# Patient Record
Sex: MALE | Race: BLACK OR AFRICAN AMERICAN | NOT HISPANIC OR LATINO | Employment: FULL TIME | URBAN - METROPOLITAN AREA
[De-identification: names, ages, dates, MRNs, and addresses within clinical notes are randomized per-mention and may not be internally consistent; named-entity substitution may affect disease eponyms.]

---

## 2020-05-08 ENCOUNTER — HISTORICAL (OUTPATIENT)
Dept: LAB | Facility: HOSPITAL | Age: 45
End: 2020-05-08

## 2020-12-07 ENCOUNTER — HISTORICAL (OUTPATIENT)
Dept: LAB | Facility: HOSPITAL | Age: 45
End: 2020-12-07

## 2021-07-19 ENCOUNTER — HISTORICAL (OUTPATIENT)
Dept: LAB | Facility: HOSPITAL | Age: 46
End: 2021-07-19

## 2021-07-19 LAB — SARS-COV-2 RNA RESP QL NAA+PROBE: DETECTED

## 2021-12-28 ENCOUNTER — HISTORICAL (OUTPATIENT)
Dept: LAB | Facility: HOSPITAL | Age: 46
End: 2021-12-28

## 2022-04-11 ENCOUNTER — HISTORICAL (OUTPATIENT)
Dept: ADMINISTRATIVE | Facility: HOSPITAL | Age: 47
End: 2022-04-11
Payer: COMMERCIAL

## 2022-04-29 VITALS — OXYGEN SATURATION: 100 % | SYSTOLIC BLOOD PRESSURE: 124 MMHG | DIASTOLIC BLOOD PRESSURE: 98 MMHG

## 2022-06-03 ENCOUNTER — OFFICE VISIT (OUTPATIENT)
Dept: PRIMARY CARE CLINIC | Facility: CLINIC | Age: 47
End: 2022-06-03
Payer: COMMERCIAL

## 2022-06-03 VITALS
OXYGEN SATURATION: 98 % | BODY MASS INDEX: 28.77 KG/M2 | HEART RATE: 64 BPM | RESPIRATION RATE: 20 BRPM | SYSTOLIC BLOOD PRESSURE: 120 MMHG | WEIGHT: 201 LBS | DIASTOLIC BLOOD PRESSURE: 80 MMHG | HEIGHT: 70 IN

## 2022-06-03 DIAGNOSIS — Z12.11 SCREENING FOR COLON CANCER: ICD-10-CM

## 2022-06-03 DIAGNOSIS — N52.9 ERECTILE DYSFUNCTION, UNSPECIFIED ERECTILE DYSFUNCTION TYPE: ICD-10-CM

## 2022-06-03 DIAGNOSIS — G47.26 SHIFT WORK SLEEP DISORDER: Primary | ICD-10-CM

## 2022-06-03 DIAGNOSIS — Z00.00 WELLNESS EXAMINATION: ICD-10-CM

## 2022-06-03 PROCEDURE — 99213 PR OFFICE/OUTPT VISIT, EST, LEVL III, 20-29 MIN: ICD-10-PCS | Mod: ,,, | Performed by: STUDENT IN AN ORGANIZED HEALTH CARE EDUCATION/TRAINING PROGRAM

## 2022-06-03 PROCEDURE — 3074F SYST BP LT 130 MM HG: CPT | Mod: CPTII,,, | Performed by: STUDENT IN AN ORGANIZED HEALTH CARE EDUCATION/TRAINING PROGRAM

## 2022-06-03 PROCEDURE — 3008F BODY MASS INDEX DOCD: CPT | Mod: CPTII,,, | Performed by: STUDENT IN AN ORGANIZED HEALTH CARE EDUCATION/TRAINING PROGRAM

## 2022-06-03 PROCEDURE — 3079F DIAST BP 80-89 MM HG: CPT | Mod: CPTII,,, | Performed by: STUDENT IN AN ORGANIZED HEALTH CARE EDUCATION/TRAINING PROGRAM

## 2022-06-03 PROCEDURE — 1160F RVW MEDS BY RX/DR IN RCRD: CPT | Mod: CPTII,,, | Performed by: STUDENT IN AN ORGANIZED HEALTH CARE EDUCATION/TRAINING PROGRAM

## 2022-06-03 PROCEDURE — 1160F PR REVIEW ALL MEDS BY PRESCRIBER/CLIN PHARMACIST DOCUMENTED: ICD-10-PCS | Mod: CPTII,,, | Performed by: STUDENT IN AN ORGANIZED HEALTH CARE EDUCATION/TRAINING PROGRAM

## 2022-06-03 PROCEDURE — 1159F MED LIST DOCD IN RCRD: CPT | Mod: CPTII,,, | Performed by: STUDENT IN AN ORGANIZED HEALTH CARE EDUCATION/TRAINING PROGRAM

## 2022-06-03 PROCEDURE — 3074F PR MOST RECENT SYSTOLIC BLOOD PRESSURE < 130 MM HG: ICD-10-PCS | Mod: CPTII,,, | Performed by: STUDENT IN AN ORGANIZED HEALTH CARE EDUCATION/TRAINING PROGRAM

## 2022-06-03 PROCEDURE — 1159F PR MEDICATION LIST DOCUMENTED IN MEDICAL RECORD: ICD-10-PCS | Mod: CPTII,,, | Performed by: STUDENT IN AN ORGANIZED HEALTH CARE EDUCATION/TRAINING PROGRAM

## 2022-06-03 PROCEDURE — 99213 OFFICE O/P EST LOW 20 MIN: CPT | Mod: ,,, | Performed by: STUDENT IN AN ORGANIZED HEALTH CARE EDUCATION/TRAINING PROGRAM

## 2022-06-03 PROCEDURE — 3008F PR BODY MASS INDEX (BMI) DOCUMENTED: ICD-10-PCS | Mod: CPTII,,, | Performed by: STUDENT IN AN ORGANIZED HEALTH CARE EDUCATION/TRAINING PROGRAM

## 2022-06-03 PROCEDURE — 3079F PR MOST RECENT DIASTOLIC BLOOD PRESSURE 80-89 MM HG: ICD-10-PCS | Mod: CPTII,,, | Performed by: STUDENT IN AN ORGANIZED HEALTH CARE EDUCATION/TRAINING PROGRAM

## 2022-06-03 RX ORDER — TADALAFIL 5 MG/1
5 TABLET ORAL DAILY PRN
COMMUNITY
Start: 2022-02-22 | End: 2022-06-03 | Stop reason: SDUPTHER

## 2022-06-03 RX ORDER — TADALAFIL 20 MG/1
20 TABLET ORAL DAILY PRN
Qty: 30 TABLET | Refills: 5 | Status: SHIPPED | OUTPATIENT
Start: 2022-06-03 | End: 2023-02-13 | Stop reason: SDUPTHER

## 2022-06-03 RX ORDER — QUETIAPINE FUMARATE 50 MG/1
TABLET, FILM COATED ORAL
COMMUNITY
Start: 2022-03-15 | End: 2022-06-03 | Stop reason: SDUPTHER

## 2022-06-03 RX ORDER — QUETIAPINE FUMARATE 50 MG/1
TABLET, FILM COATED ORAL
Qty: 30 TABLET | Refills: 5 | Status: SHIPPED | OUTPATIENT
Start: 2022-06-03 | End: 2023-02-13 | Stop reason: SDUPTHER

## 2022-06-03 NOTE — ASSESSMENT & PLAN NOTE
Responded well to Seroquel 50 mg (1 to 2 tabs depending on circumstances, fatigue level, etc.). Refilled this. Can take 1 to 2 tabs PRN for sleep

## 2022-06-03 NOTE — ASSESSMENT & PLAN NOTE
Increase Cialis to max of 20 mg daily. Discussed risks and potential side effects. Pt expressed understanding.   Will also check testosterone (has some fatigue, weakness, etc) and PSA (needed for wellness anyway)

## 2022-06-03 NOTE — PROGRESS NOTES
Subjective:       Patient ID: Samuel García is a 47 y.o. male.    Past Medical History:  Headache  Insomnia     Chief Complaint: Follow-up (States sleeping better)    Presents for f/u on sleep issues and erectile dysfunction. Sleeping better now. Using 50 mg Seroquel most nights and tolerating well. Erectile dysfunction persists. He tried Cialis 5 mg and 10 mg as well as paroxetine (suspected premature ejaculation was primary issue). None of these worked fully. He tried Cialis 20 mg and had a very good response. Never had labs done from first visit.    Review of Systems   Constitutional: Negative for chills, fatigue, fever and unexpected weight change.   HENT: Negative for nasal congestion, hearing loss, sinus pressure/congestion and sore throat.    Eyes: Negative for pain, redness and visual disturbance.   Respiratory: Negative for cough, shortness of breath and wheezing.    Cardiovascular: Negative for chest pain, palpitations and leg swelling.   Gastrointestinal: Negative for abdominal pain, change in bowel habit, diarrhea, nausea, vomiting and change in bowel habit.   Endocrine: Negative for cold intolerance, heat intolerance, polydipsia and polyuria.   Genitourinary: Negative for dysuria, frequency, hematuria and urgency.   Musculoskeletal: Negative for arthralgias, joint swelling and myalgias.   Integumentary:  Negative for color change and rash.   Neurological: Negative for dizziness, syncope, weakness, numbness and headaches.   Hematological: Negative for adenopathy. Does not bruise/bleed easily.   Psychiatric/Behavioral: Negative for confusion. The patient is not nervous/anxious.            Objective:      Physical Exam  Vitals and nursing note reviewed.   Constitutional:       General: He is not in acute distress.     Appearance: He is not ill-appearing.   HENT:      Nose: No congestion or rhinorrhea.      Mouth/Throat:      Mouth: Mucous membranes are moist.      Pharynx: No oropharyngeal exudate or  posterior oropharyngeal erythema.   Eyes:      Extraocular Movements: Extraocular movements intact.      Conjunctiva/sclera: Conjunctivae normal.      Pupils: Pupils are equal, round, and reactive to light.   Cardiovascular:      Rate and Rhythm: Normal rate and regular rhythm.      Pulses: Normal pulses.      Heart sounds: No murmur heard.  Pulmonary:      Effort: Pulmonary effort is normal.      Breath sounds: Normal breath sounds.   Abdominal:      Palpations: Abdomen is soft. There is no mass.      Tenderness: There is no abdominal tenderness.   Musculoskeletal:         General: No deformity or signs of injury.      Cervical back: Neck supple.      Right lower leg: No edema.      Left lower leg: No edema.   Lymphadenopathy:      Cervical: No cervical adenopathy.   Skin:     General: Skin is warm and dry.      Findings: No rash.   Neurological:      General: No focal deficit present.      Mental Status: He is alert. Mental status is at baseline.   Psychiatric:         Mood and Affect: Mood normal.         Behavior: Behavior normal.           Assessment & Plan:   1. Shift work sleep disorder  Assessment & Plan:  Responded well to Seroquel 50 mg (1 to 2 tabs depending on circumstances, fatigue level, etc.). Refilled this. Can take 1 to 2 tabs PRN for sleep    Orders:  -     QUEtiapine (SEROQUEL) 50 MG tablet    2. Erectile dysfunction, unspecified erectile dysfunction type  Assessment & Plan:  Increase Cialis to max of 20 mg daily. Discussed risks and potential side effects. Pt expressed understanding.   Will also check testosterone (has some fatigue, weakness, etc) and PSA (needed for wellness anyway)    Orders:  -     tadalafiL (CIALIS) 20 MG Tab  -     PSA, Screening  -     Testosterone    3. Wellness examination  Comments:  pt never had labs drawn from establish care visit, re ordered today  Orders:  -     CBC Auto Differential  -     Comprehensive Metabolic Panel  -     PSA, Screening  -     TSH  -      Urinalysis  -     Hemoglobin A1C    4. Screening for colon cancer  -     Ambulatory referral/consult to Gastroenterology

## 2022-08-16 ENCOUNTER — OFFICE VISIT (OUTPATIENT)
Dept: PRIMARY CARE CLINIC | Facility: CLINIC | Age: 47
End: 2022-08-16
Payer: COMMERCIAL

## 2022-08-16 DIAGNOSIS — M54.50 ACUTE MIDLINE LOW BACK PAIN WITHOUT SCIATICA: Primary | ICD-10-CM

## 2022-08-16 DIAGNOSIS — M54.9 ACUTE MID BACK PAIN: ICD-10-CM
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
## 2022-08-16 PROCEDURE — 99213 PR OFFICE/OUTPT VISIT, EST, LEVL III, 20-29 MIN: ICD-10-PCS | Mod: 95,,, | Performed by: STUDENT IN AN ORGANIZED HEALTH CARE EDUCATION/TRAINING PROGRAM

## 2022-08-16 PROCEDURE — 99213 OFFICE O/P EST LOW 20 MIN: CPT | Mod: 95,,, | Performed by: STUDENT IN AN ORGANIZED HEALTH CARE EDUCATION/TRAINING PROGRAM

## 2022-08-16 RX ORDER — TRAMADOL HYDROCHLORIDE 50 MG/1
50 TABLET ORAL EVERY 8 HOURS PRN
Qty: 15 TABLET | Refills: 0 | Status: SHIPPED | OUTPATIENT
Start: 2022-08-16 | End: 2023-02-13

## 2022-08-16 RX ORDER — CYCLOBENZAPRINE HCL 5 MG
5 TABLET ORAL 3 TIMES DAILY PRN
Qty: 15 TABLET | Refills: 0 | Status: SHIPPED | OUTPATIENT
Start: 2022-08-16 | End: 2022-08-21

## 2022-08-16 NOTE — LETTER
August 16, 2022      [facility]  [address], [address]  [place] [address]  Phone: [phone]       Patient: Samuel García   YOB: 1975  Date of Visit: 08/16/2022    To Whom It May Concern:    Samuel García was evaluated by me, his Primary Care Physician, on 08/16/2022. Please excuse Mr. García from work from 8/16/22 through 8/19/22. The patient may return to work/school on 8/20/2022 with no restrictions. If you have any questions or concerns, or if I can be of further assistance, please do not hesitate to contact me.    Sincerely,    Jose Daniel Rodas MD

## 2022-08-16 NOTE — LETTER
August 16, 2022      [facility]  [address], [address]  [place] [address]  Phone: [phone]       Patient: Samuel García   YOB: 1975  Date of Visit: 08/16/2022    To Whom It May Concern:    Samuel García was evaluated by me, his Primary Care Physician, on 08/16/2022. Please excuse the patient from work from 8/16/22 through 8/18/22. The patient may return to work on 8/19/22 with no restrictions. If you have any questions or concerns, or if I can be of further assistance, please do not hesitate to contact me.    Sincerely,    Jose Daniel Rodas MD
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
## 2022-08-17 LAB
APPEARANCE UR: CLEAR
BACTERIA #/AREA URNS AUTO: NORMAL /HPF
BILIRUB UR QL STRIP.AUTO: NEGATIVE MG/DL
COLOR UR AUTO: YELLOW
GLUCOSE UR QL STRIP.AUTO: NEGATIVE MG/DL
KETONES UR QL STRIP.AUTO: ABNORMAL MG/DL
LEUKOCYTE ESTERASE UR QL STRIP.AUTO: NEGATIVE UNIT/L
NITRITE UR QL STRIP.AUTO: NEGATIVE
PH UR STRIP.AUTO: 5.5 [PH]
PROT UR QL STRIP.AUTO: NEGATIVE MG/DL
RBC #/AREA URNS AUTO: <5 /HPF
RBC UR QL AUTO: NEGATIVE UNIT/L
SP GR UR STRIP.AUTO: 1.03 (ref 1–1.03)
SQUAMOUS #/AREA URNS AUTO: <5 /HPF
UROBILINOGEN UR STRIP-ACNC: 0.2 MG/DL
WBC #/AREA URNS AUTO: <5 /HPF

## 2022-08-17 PROCEDURE — 84443 ASSAY THYROID STIM HORMONE: CPT | Performed by: STUDENT IN AN ORGANIZED HEALTH CARE EDUCATION/TRAINING PROGRAM

## 2022-08-17 PROCEDURE — 85025 COMPLETE CBC W/AUTO DIFF WBC: CPT | Performed by: STUDENT IN AN ORGANIZED HEALTH CARE EDUCATION/TRAINING PROGRAM

## 2022-08-17 PROCEDURE — 84403 ASSAY OF TOTAL TESTOSTERONE: CPT | Performed by: STUDENT IN AN ORGANIZED HEALTH CARE EDUCATION/TRAINING PROGRAM

## 2022-08-17 PROCEDURE — 84153 ASSAY OF PSA TOTAL: CPT | Performed by: STUDENT IN AN ORGANIZED HEALTH CARE EDUCATION/TRAINING PROGRAM

## 2022-08-17 PROCEDURE — 83036 HEMOGLOBIN GLYCOSYLATED A1C: CPT | Performed by: STUDENT IN AN ORGANIZED HEALTH CARE EDUCATION/TRAINING PROGRAM

## 2022-08-17 PROCEDURE — 80053 COMPREHEN METABOLIC PANEL: CPT | Performed by: STUDENT IN AN ORGANIZED HEALTH CARE EDUCATION/TRAINING PROGRAM

## 2022-08-17 PROCEDURE — 81001 URINALYSIS AUTO W/SCOPE: CPT | Performed by: STUDENT IN AN ORGANIZED HEALTH CARE EDUCATION/TRAINING PROGRAM

## 2022-08-18 ENCOUNTER — LAB REQUISITION (OUTPATIENT)
Dept: LAB | Facility: HOSPITAL | Age: 47
End: 2022-08-18
Payer: COMMERCIAL

## 2022-08-18 ENCOUNTER — TELEPHONE (OUTPATIENT)
Dept: PRIMARY CARE CLINIC | Facility: CLINIC | Age: 47
End: 2022-08-18

## 2022-08-18 ENCOUNTER — HOSPITAL ENCOUNTER (EMERGENCY)
Facility: HOSPITAL | Age: 47
Discharge: HOME | End: 2022-08-18
Attending: EMERGENCY MEDICINE
Payer: COMMERCIAL

## 2022-08-18 ENCOUNTER — PATIENT MESSAGE (OUTPATIENT)
Dept: PRIMARY CARE CLINIC | Facility: CLINIC | Age: 47
End: 2022-08-18

## 2022-08-18 VITALS
OXYGEN SATURATION: 97 % | BODY MASS INDEX: 27.2 KG/M2 | DIASTOLIC BLOOD PRESSURE: 89 MMHG | WEIGHT: 190 LBS | HEIGHT: 70 IN | SYSTOLIC BLOOD PRESSURE: 146 MMHG | RESPIRATION RATE: 18 BRPM | TEMPERATURE: 98 F | HEART RATE: 83 BPM

## 2022-08-18 DIAGNOSIS — N52.9 MALE ERECTILE DYSFUNCTION, UNSPECIFIED: ICD-10-CM

## 2022-08-18 DIAGNOSIS — R50.9 FEVER, UNSPECIFIED FEVER CAUSE: Primary | ICD-10-CM

## 2022-08-18 DIAGNOSIS — Z00.00 ENCOUNTER FOR GENERAL ADULT MEDICAL EXAMINATION WITHOUT ABNORMAL FINDINGS: ICD-10-CM

## 2022-08-18 LAB
ALBUMIN SERPL-MCNC: 4.2 GM/DL (ref 3.5–5)
ALBUMIN/GLOB SERPL: 1.1 RATIO (ref 1.1–2)
ALP SERPL-CCNC: 74 UNIT/L (ref 40–150)
ALT SERPL-CCNC: 27 UNIT/L (ref 0–55)
AST SERPL-CCNC: 22 UNIT/L (ref 5–34)
BASOPHILS # BLD AUTO: 0.03 X10(3)/MCL (ref 0–0.2)
BASOPHILS NFR BLD AUTO: 0.4 %
BILIRUBIN DIRECT+TOT PNL SERPL-MCNC: 0.5 MG/DL
BUN SERPL-MCNC: 15.3 MG/DL (ref 8.9–20.6)
CALCIUM SERPL-MCNC: 10 MG/DL (ref 8.4–10.2)
CHLORIDE SERPL-SCNC: 108 MMOL/L (ref 98–107)
CO2 SERPL-SCNC: 24 MMOL/L (ref 22–29)
CREAT SERPL-MCNC: 1.29 MG/DL (ref 0.73–1.18)
EOSINOPHIL # BLD AUTO: 0.07 X10(3)/MCL (ref 0–0.9)
EOSINOPHIL NFR BLD AUTO: 1 %
ERYTHROCYTE [DISTWIDTH] IN BLOOD BY AUTOMATED COUNT: 13.8 % (ref 11.5–17)
EST. AVERAGE GLUCOSE BLD GHB EST-MCNC: 93.9 MG/DL
FLUAV AG UPPER RESP QL IA.RAPID: NOT DETECTED
FLUBV AG UPPER RESP QL IA.RAPID: NOT DETECTED
GFR SERPLBLD CREATININE-BSD FMLA CKD-EPI: >60 MLS/MIN/1.73/M2
GLOBULIN SER-MCNC: 3.9 GM/DL (ref 2.4–3.5)
GLUCOSE SERPL-MCNC: 56 MG/DL (ref 74–100)
HBA1C MFR BLD: 4.9 %
HCT VFR BLD AUTO: 49.1 % (ref 42–52)
HGB BLD-MCNC: 16.2 GM/DL (ref 14–18)
IMM GRANULOCYTES # BLD AUTO: 0.02 X10(3)/MCL (ref 0–0.04)
IMM GRANULOCYTES NFR BLD AUTO: 0.3 %
LYMPHOCYTES # BLD AUTO: 1.97 X10(3)/MCL (ref 0.6–4.6)
LYMPHOCYTES NFR BLD AUTO: 29.5 %
MCH RBC QN AUTO: 31 PG (ref 27–31)
MCHC RBC AUTO-ENTMCNC: 33 MG/DL (ref 33–36)
MCV RBC AUTO: 94.1 FL (ref 80–94)
MONOCYTES # BLD AUTO: 0.58 X10(3)/MCL (ref 0.1–1.3)
MONOCYTES NFR BLD AUTO: 8.7 %
NEUTROPHILS # BLD AUTO: 4 X10(3)/MCL (ref 2.1–9.2)
NEUTROPHILS NFR BLD AUTO: 60.1 %
NRBC BLD AUTO-RTO: 0 %
PLATELET # BLD AUTO: 257 X10(3)/MCL (ref 130–400)
PMV BLD AUTO: 10.6 FL (ref 7.4–10.4)
POTASSIUM SERPL-SCNC: 5.1 MMOL/L (ref 3.5–5.1)
PROT SERPL-MCNC: 8.1 GM/DL (ref 6.4–8.3)
RBC # BLD AUTO: 5.22 X10(6)/MCL (ref 4.7–6.1)
SARS-COV-2 RNA RESP QL NAA+PROBE: DETECTED
SODIUM SERPL-SCNC: 142 MMOL/L (ref 136–145)
TSH SERPL-ACNC: 0.69 UIU/ML (ref 0.35–4.94)
WBC # SPEC AUTO: 6.7 X10(3)/MCL (ref 4.5–11.5)

## 2022-08-18 PROCEDURE — 87636 SARSCOV2 & INF A&B AMP PRB: CPT | Performed by: EMERGENCY MEDICINE

## 2022-08-18 PROCEDURE — 99282 EMERGENCY DEPT VISIT SF MDM: CPT | Mod: 25

## 2022-08-18 NOTE — ED PROVIDER NOTES
Encounter Date: 8/18/2022       History     Chief Complaint   Patient presents with    Fever     Pt to er c/o bodyaches, fever and chills onset this morning.     47-year-old male complains of fever and body aches which started this morning.  His wife has COVID-19 infection so he thinks he may have picked up a COVID infection.  He is here today also to do outpatient labs ordered by his primary care provider.  He has no sore throat, cough, ear pain, chest pain, or other complaint.        Review of patient's allergies indicates:  No Known Allergies  Past Medical History:   Diagnosis Date    Headache     Insomnia      No past surgical history on file.  Family History   Problem Relation Age of Onset    Diabetes Mother     Hypertension Brother      Social History     Tobacco Use    Smoking status: Never Smoker    Smokeless tobacco: Never Used     Review of Systems   Constitutional: Positive for fever.   All other systems reviewed and are negative.      Physical Exam     Initial Vitals [08/18/22 0956]   BP Pulse Resp Temp SpO2   (!) 146/89 83 18 98.1 °F (36.7 °C) 97 %      MAP       --         Physical Exam    Nursing note and vitals reviewed.  Constitutional: Vital signs are normal. He appears well-developed and well-nourished.   HENT:   Head: Normocephalic and atraumatic.   Right Ear: External ear normal.   Left Ear: External ear normal.   Mouth/Throat: Oropharynx is clear and moist.   Eyes: Pupils are equal, round, and reactive to light.   Neck: Neck supple.   Cardiovascular: Normal rate, regular rhythm and normal heart sounds.   Pulmonary/Chest: Breath sounds normal. No respiratory distress.   Abdominal: Abdomen is soft. There is no abdominal tenderness.   Musculoskeletal:      Cervical back: Neck supple. No edema or erythema.     Neurological: He is alert.   Skin: Skin is warm and dry. Capillary refill takes less than 2 seconds.   Psychiatric: He has a normal mood and affect.         ED Course    Procedures  Labs Reviewed   COVID/FLU A&B PCR          Imaging Results    None          Medications - No data to display  Medical Decision Making:   ED Management:  Patient would like to proceed to have his outpatient lab work done.  He will follow-up is test report using the patient portal.                      Clinical Impression:   Final diagnoses:  [R50.9] Fever, unspecified fever cause (Primary)          ED Disposition Condition    Discharge Stable        ED Prescriptions     None        Follow-up Information     Follow up With Specialties Details Why Contact Info    Jose Daniel Rodas MD Internal Medicine Schedule an appointment as soon as possible for a visit in 1 week  [address] [facility] [address]  [phone]             Laine Guerra MD  08/18/22 1009       Laine Guerra MD  08/18/22 1011

## 2022-08-18 NOTE — TELEPHONE ENCOUNTER
Main lab called to report that they could not process the labs for  PSA  Testorone  Patient need to return to the lab to get redrawn in the proper tube.  Patient informed and report that he woke up feeling bad so he may not get it done until next week.  Patient stated he has to get tested for covid  Because she was displaying those symptoms.

## 2022-08-18 NOTE — Clinical Note
"Samuel MICHAEL"Raquel was seen and treated in our emergency department on 8/18/2022.  He may return to work on 08/19/2022.       If you have any questions or concerns, please don't hesitate to call.      Laine Guerra MD"

## 2022-08-18 NOTE — Clinical Note
"Samuel MICHAEL"Raquel was seen and treated in our emergency department on 8/18/2022.  He may return to work on 08/24/2022.       If you have any questions or concerns, please don't hesitate to call.      Laine Guerra MD"

## 2022-08-23 ENCOUNTER — TELEPHONE (OUTPATIENT)
Dept: PRIMARY CARE CLINIC | Facility: CLINIC | Age: 47
End: 2022-08-23

## 2022-09-06 ENCOUNTER — PATIENT MESSAGE (OUTPATIENT)
Dept: PRIMARY CARE CLINIC | Facility: CLINIC | Age: 47
End: 2022-09-06

## 2023-01-30 ENCOUNTER — PATIENT MESSAGE (OUTPATIENT)
Dept: ADMINISTRATIVE | Facility: HOSPITAL | Age: 48
End: 2023-01-30
Payer: COMMERCIAL

## 2023-02-13 ENCOUNTER — OFFICE VISIT (OUTPATIENT)
Dept: PRIMARY CARE CLINIC | Facility: CLINIC | Age: 48
End: 2023-02-13
Payer: COMMERCIAL

## 2023-02-13 VITALS
BODY MASS INDEX: 28.88 KG/M2 | RESPIRATION RATE: 18 BRPM | DIASTOLIC BLOOD PRESSURE: 84 MMHG | TEMPERATURE: 98 F | HEIGHT: 69 IN | WEIGHT: 195 LBS | SYSTOLIC BLOOD PRESSURE: 130 MMHG | HEART RATE: 86 BPM | OXYGEN SATURATION: 99 %

## 2023-02-13 DIAGNOSIS — Z12.11 SCREEN FOR COLON CANCER: ICD-10-CM

## 2023-02-13 DIAGNOSIS — N52.9 ERECTILE DYSFUNCTION, UNSPECIFIED ERECTILE DYSFUNCTION TYPE: ICD-10-CM

## 2023-02-13 DIAGNOSIS — R79.89 ELEVATED SERUM CREATININE: ICD-10-CM

## 2023-02-13 DIAGNOSIS — G47.26 SHIFT WORK SLEEP DISORDER: ICD-10-CM

## 2023-02-13 DIAGNOSIS — H53.8 BLURRED VISION: Primary | ICD-10-CM

## 2023-02-13 DIAGNOSIS — R53.83 LOW ENERGY: ICD-10-CM

## 2023-02-13 PROCEDURE — 1160F PR REVIEW ALL MEDS BY PRESCRIBER/CLIN PHARMACIST DOCUMENTED: ICD-10-PCS | Mod: CPTII,,, | Performed by: STUDENT IN AN ORGANIZED HEALTH CARE EDUCATION/TRAINING PROGRAM

## 2023-02-13 PROCEDURE — 99214 PR OFFICE/OUTPT VISIT, EST, LEVL IV, 30-39 MIN: ICD-10-PCS | Mod: ,,, | Performed by: STUDENT IN AN ORGANIZED HEALTH CARE EDUCATION/TRAINING PROGRAM

## 2023-02-13 PROCEDURE — 1159F MED LIST DOCD IN RCRD: CPT | Mod: CPTII,,, | Performed by: STUDENT IN AN ORGANIZED HEALTH CARE EDUCATION/TRAINING PROGRAM

## 2023-02-13 PROCEDURE — 1159F PR MEDICATION LIST DOCUMENTED IN MEDICAL RECORD: ICD-10-PCS | Mod: CPTII,,, | Performed by: STUDENT IN AN ORGANIZED HEALTH CARE EDUCATION/TRAINING PROGRAM

## 2023-02-13 PROCEDURE — 99214 OFFICE O/P EST MOD 30 MIN: CPT | Mod: ,,, | Performed by: STUDENT IN AN ORGANIZED HEALTH CARE EDUCATION/TRAINING PROGRAM

## 2023-02-13 PROCEDURE — 1160F RVW MEDS BY RX/DR IN RCRD: CPT | Mod: CPTII,,, | Performed by: STUDENT IN AN ORGANIZED HEALTH CARE EDUCATION/TRAINING PROGRAM

## 2023-02-13 RX ORDER — QUETIAPINE FUMARATE 50 MG/1
50 TABLET, FILM COATED ORAL NIGHTLY
Qty: 30 TABLET | Refills: 11 | Status: SHIPPED | OUTPATIENT
Start: 2023-02-13

## 2023-02-13 RX ORDER — TADALAFIL 20 MG/1
20 TABLET ORAL DAILY PRN
Qty: 30 TABLET | Refills: 5 | Status: SHIPPED | OUTPATIENT
Start: 2023-02-13

## 2023-02-13 NOTE — ASSESSMENT & PLAN NOTE
Mild loss of near vision. No abrupt vision loss, eye redness, pain, swelling, focal visual deficits, etc. Recommended pt see Optometry (has vision benefits).  Expressed understanding.    It is reasonable to hold Cialis for 2-4 weeks to see if vision improves, although the visual deficits typically reported with ED medications are abrupt, dramatic changes rather than just slow, mild blurring

## 2023-02-13 NOTE — PROGRESS NOTES
Subjective:       Patient ID: Samuel García is a 47 y.o. male.    ----------------------------  Headache  Insomnia     Chief Complaint: Follow-up and referral to opth    C/o blurred near vision.  Far vision is still intact.  Patient has noticed this issue for several months, worse when trying to read his phone.  He is requesting ophthalmology referral.  Counseled that he likely needs optometry 1st.  Patient also inquires about potential side effects of Cialis says he plans on holding the medicine for a few weeks to see if vision improves.    Still c/o low energy.  Sleeping better with Seroquel but still tired throughout the day.  Still working night shifts.    Review of Systems   Constitutional:  Positive for fatigue. Negative for chills and fever.   HENT:  Negative for sinus pressure/congestion and sore throat.    Eyes:  Positive for visual disturbance.   Respiratory:  Negative for cough, shortness of breath and wheezing.    Cardiovascular:  Negative for chest pain and leg swelling.   Gastrointestinal:  Negative for abdominal pain, nausea and vomiting.   Genitourinary:  Negative for dysuria and hematuria.   Musculoskeletal:  Negative for arthralgias and myalgias.   Integumentary:  Negative for rash.   Neurological:  Negative for dizziness and syncope.   Hematological:  Negative for adenopathy.   Psychiatric/Behavioral:  Negative for confusion. The patient is not nervous/anxious.          Objective:      Physical Exam  Constitutional:       General: He is not in acute distress.  HENT:      Head: Normocephalic.      Nose: No rhinorrhea.   Eyes:      Conjunctiva/sclera: Conjunctivae normal.      Pupils: Pupils are equal, round, and reactive to light.   Cardiovascular:      Rate and Rhythm: Normal rate and regular rhythm.   Pulmonary:      Effort: Pulmonary effort is normal.      Breath sounds: Normal breath sounds.   Abdominal:      Palpations: Abdomen is soft.      Tenderness: There is no abdominal tenderness.    Musculoskeletal:         General: No deformity or signs of injury.   Skin:     General: Skin is warm and dry.   Neurological:      General: No focal deficit present.      Mental Status: He is alert. Mental status is at baseline.   Psychiatric:         Mood and Affect: Mood normal.         Behavior: Behavior normal.         Assessment & Plan:   1. Blurred vision  Assessment & Plan:  Mild loss of near vision. No abrupt vision loss, eye redness, pain, swelling, focal visual deficits, etc. Recommended pt see Optometry (has vision benefits).  Expressed understanding.    It is reasonable to hold Cialis for 2-4 weeks to see if vision improves, although the visual deficits typically reported with ED medications are abrupt, dramatic changes rather than just slow, mild blurring      2. Low energy  Comments:  Checking vitamin-D, testosterone, TSH  Orders:  -     Vitamin D; Future; Expected date: 02/13/2023  -     Testosterone; Future; Expected date: 02/13/2023  -     TSH; Future; Expected date: 02/13/2023    3. Elevated serum creatinine  Comments:  previous mild elevation, repeat CMP  Orders:  -     Comprehensive Metabolic Panel; Future; Expected date: 02/13/2023    4. Erectile dysfunction, unspecified erectile dysfunction type  -     tadalafiL (CIALIS) 20 MG Tab; Take 1 tablet (20 mg total) by mouth daily as needed (erectile dysfunction).  Dispense: 30 tablet; Refill: 5    5. Shift work sleep disorder  -     QUEtiapine (SEROQUEL) 50 MG tablet; Take 1 tablet (50 mg total) by mouth nightly.  Dispense: 30 tablet; Refill: 11    6. Screen for colon cancer  Comments:  Discussed options, previously was interested in colonoscopy but now prefers Cologuard  Orders:  -     Cologuard Screening (Multitarget Stool DNA); Future; Expected date: 02/13/2023      Follow up in about 6 months (around 8/13/2023) for Wellness. In addition to their scheduled follow up, the patient has also been instructed to follow up on as needed basis.

## 2023-02-23 LAB — NONINV COLON CA DNA+OCC BLD SCRN STL QL: NEGATIVE

## 2023-03-09 ENCOUNTER — LAB VISIT (OUTPATIENT)
Dept: LAB | Facility: HOSPITAL | Age: 48
End: 2023-03-09
Attending: STUDENT IN AN ORGANIZED HEALTH CARE EDUCATION/TRAINING PROGRAM
Payer: COMMERCIAL

## 2023-03-09 DIAGNOSIS — R79.89 ELEVATED SERUM CREATININE: ICD-10-CM

## 2023-03-09 DIAGNOSIS — R53.83 LOW ENERGY: ICD-10-CM

## 2023-03-09 LAB
ALBUMIN SERPL-MCNC: 4.2 G/DL (ref 3.5–5)
ALBUMIN/GLOB SERPL: 1.2 RATIO (ref 1.1–2)
ALP SERPL-CCNC: 64 UNIT/L (ref 40–150)
ALT SERPL-CCNC: 41 UNIT/L (ref 0–55)
AST SERPL-CCNC: 33 UNIT/L (ref 5–34)
BILIRUBIN DIRECT+TOT PNL SERPL-MCNC: 0.7 MG/DL
BUN SERPL-MCNC: 13.1 MG/DL (ref 8.9–20.6)
CALCIUM SERPL-MCNC: 9.4 MG/DL (ref 8.4–10.2)
CHLORIDE SERPL-SCNC: 108 MMOL/L (ref 98–107)
CO2 SERPL-SCNC: 25 MMOL/L (ref 22–29)
CREAT SERPL-MCNC: 1.22 MG/DL (ref 0.73–1.18)
DEPRECATED CALCIDIOL+CALCIFEROL SERPL-MC: 39.9 NG/ML (ref 30–80)
GFR SERPLBLD CREATININE-BSD FMLA CKD-EPI: >60 MLS/MIN/1.73/M2
GLOBULIN SER-MCNC: 3.6 GM/DL (ref 2.4–3.5)
GLUCOSE SERPL-MCNC: 98 MG/DL (ref 74–100)
POTASSIUM SERPL-SCNC: 4.7 MMOL/L (ref 3.5–5.1)
PROT SERPL-MCNC: 7.8 GM/DL (ref 6.4–8.3)
SODIUM SERPL-SCNC: 141 MMOL/L (ref 136–145)
TESTOST SERPL-MCNC: 364.61 NG/DL (ref 240.24–870.68)
TSH SERPL-ACNC: 0.97 UIU/ML (ref 0.35–4.94)

## 2023-03-09 PROCEDURE — 82306 VITAMIN D 25 HYDROXY: CPT

## 2023-03-09 PROCEDURE — 80053 COMPREHEN METABOLIC PANEL: CPT

## 2023-03-09 PROCEDURE — 36415 COLL VENOUS BLD VENIPUNCTURE: CPT

## 2023-03-09 PROCEDURE — 84403 ASSAY OF TOTAL TESTOSTERONE: CPT

## 2023-03-09 PROCEDURE — 84443 ASSAY THYROID STIM HORMONE: CPT

## 2023-03-10 ENCOUNTER — PATIENT MESSAGE (OUTPATIENT)
Dept: PRIMARY CARE CLINIC | Facility: CLINIC | Age: 48
End: 2023-03-10
Payer: COMMERCIAL

## 2023-08-07 ENCOUNTER — TELEPHONE (OUTPATIENT)
Dept: PRIMARY CARE CLINIC | Facility: CLINIC | Age: 48
End: 2023-08-07
Payer: COMMERCIAL

## 2023-08-07 DIAGNOSIS — Z00.00 WELL ADULT EXAM: Primary | ICD-10-CM

## 2023-08-07 DIAGNOSIS — R79.89 ELEVATED SERUM CREATININE: ICD-10-CM

## 2023-08-07 DIAGNOSIS — N52.9 ERECTILE DYSFUNCTION, UNSPECIFIED ERECTILE DYSFUNCTION TYPE: ICD-10-CM

## 2023-08-07 NOTE — TELEPHONE ENCOUNTER
No answer/left message on 8/7/23 at 3:46 am reminding patient about upcoming visit with labs needed prior to appointment. Lab order in chart

## 2023-08-30 ENCOUNTER — HOSPITAL ENCOUNTER (EMERGENCY)
Facility: HOSPITAL | Age: 48
Discharge: HOME | End: 2023-08-30
Attending: STUDENT IN AN ORGANIZED HEALTH CARE EDUCATION/TRAINING PROGRAM
Payer: COMMERCIAL

## 2023-08-30 VITALS
SYSTOLIC BLOOD PRESSURE: 169 MMHG | WEIGHT: 192 LBS | DIASTOLIC BLOOD PRESSURE: 103 MMHG | BODY MASS INDEX: 28.44 KG/M2 | OXYGEN SATURATION: 98 % | TEMPERATURE: 97 F | RESPIRATION RATE: 20 BRPM | HEART RATE: 63 BPM | HEIGHT: 69 IN
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
## 2023-08-30 DIAGNOSIS — S46.912A MUSCLE STRAIN OF LEFT UPPER ARM, INITIAL ENCOUNTER: ICD-10-CM
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
## 2023-08-30 PROCEDURE — 99283 EMERGENCY DEPT VISIT LOW MDM: CPT

## 2023-08-30 PROCEDURE — 25000003 PHARM REV CODE 250: Performed by: NURSE PRACTITIONER

## 2023-08-30 RX ORDER — IBUPROFEN 400 MG/1
800 TABLET ORAL
Status: COMPLETED | OUTPATIENT
Start: 2023-08-30 | End: 2023-08-30

## 2023-08-30 RX ADMIN — IBUPROFEN 800 MG: 400 TABLET ORAL at 01:08

## 2023-08-30 NOTE — Clinical Note
"Samuel MICHAEL" Raquel was seen and treated in our emergency department on 8/30/2023.  He may return to work on 09/01/2023.       If you have any questions or concerns, please don't hesitate to call.      Julisa"
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
## 2023-08-30 NOTE — DISCHARGE INSTRUCTIONS
Ibuprofen 800mg every 8 hours as needed for pain/inflammation. May rotate with tylenol. May try heat to the arm as well or muscle rubs.

## 2023-08-30 NOTE — Clinical Note
"Samuel MICHAEL" Raquel was seen and treated in our emergency department on 8/30/2023.  He may return to work on 09/01/2023.       If you have any questions or concerns, please don't hesitate to call.      Julisa ULLOA    "

## 2023-09-05 ENCOUNTER — OFFICE VISIT (OUTPATIENT)
Dept: PRIMARY CARE CLINIC | Facility: CLINIC | Age: 48
End: 2023-09-05
Payer: COMMERCIAL

## 2023-09-05 VITALS
OXYGEN SATURATION: 97 % | HEIGHT: 69 IN | RESPIRATION RATE: 20 BRPM | DIASTOLIC BLOOD PRESSURE: 75 MMHG | HEART RATE: 76 BPM | WEIGHT: 198.63 LBS | TEMPERATURE: 98 F | BODY MASS INDEX: 29.42 KG/M2 | SYSTOLIC BLOOD PRESSURE: 127 MMHG

## 2023-09-05 DIAGNOSIS — S49.92XD INJURY OF LEFT UPPER ARM, SUBSEQUENT ENCOUNTER: Primary | ICD-10-CM

## 2023-09-05 PROBLEM — S49.92XA INJURY OF LEFT UPPER ARM: Status: ACTIVE | Noted: 2023-09-05

## 2023-09-05 PROCEDURE — 1159F MED LIST DOCD IN RCRD: CPT | Mod: CPTII,,, | Performed by: STUDENT IN AN ORGANIZED HEALTH CARE EDUCATION/TRAINING PROGRAM

## 2023-09-05 PROCEDURE — 3008F BODY MASS INDEX DOCD: CPT | Mod: CPTII,,, | Performed by: STUDENT IN AN ORGANIZED HEALTH CARE EDUCATION/TRAINING PROGRAM

## 2023-09-05 PROCEDURE — 3074F PR MOST RECENT SYSTOLIC BLOOD PRESSURE < 130 MM HG: ICD-10-PCS | Mod: CPTII,,, | Performed by: STUDENT IN AN ORGANIZED HEALTH CARE EDUCATION/TRAINING PROGRAM

## 2023-09-05 PROCEDURE — 1159F PR MEDICATION LIST DOCUMENTED IN MEDICAL RECORD: ICD-10-PCS | Mod: CPTII,,, | Performed by: STUDENT IN AN ORGANIZED HEALTH CARE EDUCATION/TRAINING PROGRAM

## 2023-09-05 PROCEDURE — 1160F RVW MEDS BY RX/DR IN RCRD: CPT | Mod: CPTII,,, | Performed by: STUDENT IN AN ORGANIZED HEALTH CARE EDUCATION/TRAINING PROGRAM

## 2023-09-05 PROCEDURE — 3074F SYST BP LT 130 MM HG: CPT | Mod: CPTII,,, | Performed by: STUDENT IN AN ORGANIZED HEALTH CARE EDUCATION/TRAINING PROGRAM

## 2023-09-05 PROCEDURE — 3008F PR BODY MASS INDEX (BMI) DOCUMENTED: ICD-10-PCS | Mod: CPTII,,, | Performed by: STUDENT IN AN ORGANIZED HEALTH CARE EDUCATION/TRAINING PROGRAM

## 2023-09-05 PROCEDURE — 99213 PR OFFICE/OUTPT VISIT, EST, LEVL III, 20-29 MIN: ICD-10-PCS | Mod: ,,, | Performed by: STUDENT IN AN ORGANIZED HEALTH CARE EDUCATION/TRAINING PROGRAM

## 2023-09-05 PROCEDURE — 1160F PR REVIEW ALL MEDS BY PRESCRIBER/CLIN PHARMACIST DOCUMENTED: ICD-10-PCS | Mod: CPTII,,, | Performed by: STUDENT IN AN ORGANIZED HEALTH CARE EDUCATION/TRAINING PROGRAM

## 2023-09-05 PROCEDURE — 3078F DIAST BP <80 MM HG: CPT | Mod: CPTII,,, | Performed by: STUDENT IN AN ORGANIZED HEALTH CARE EDUCATION/TRAINING PROGRAM

## 2023-09-05 PROCEDURE — 99213 OFFICE O/P EST LOW 20 MIN: CPT | Mod: ,,, | Performed by: STUDENT IN AN ORGANIZED HEALTH CARE EDUCATION/TRAINING PROGRAM

## 2023-09-05 PROCEDURE — 3078F PR MOST RECENT DIASTOLIC BLOOD PRESSURE < 80 MM HG: ICD-10-PCS | Mod: CPTII,,, | Performed by: STUDENT IN AN ORGANIZED HEALTH CARE EDUCATION/TRAINING PROGRAM

## 2023-09-05 RX ORDER — MELOXICAM 15 MG/1
15 TABLET ORAL DAILY
Qty: 21 TABLET | Refills: 0 | Status: SHIPPED | OUTPATIENT
Start: 2023-09-05

## 2023-09-05 RX ORDER — CYCLOBENZAPRINE HCL 5 MG
5 TABLET ORAL 3 TIMES DAILY PRN
Qty: 21 TABLET | Refills: 0 | Status: SHIPPED | OUTPATIENT
Start: 2023-09-05
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
## 2023-09-14 ENCOUNTER — TELEPHONE (OUTPATIENT)
Dept: PRIMARY CARE CLINIC | Facility: CLINIC | Age: 48
End: 2023-09-14
Payer: COMMERCIAL

## 2023-09-14 ENCOUNTER — DOCUMENTATION ONLY (OUTPATIENT)
Dept: PRIMARY CARE CLINIC | Facility: CLINIC | Age: 48
End: 2023-09-14
Payer: COMMERCIAL

## 2023-09-14 NOTE — TELEPHONE ENCOUNTER
----- Message from Eunice Bowles sent at 9/14/2023 11:53 AM CDT -----  Regarding: call back  .Type:  Needs Medical Advice    Who Called: pt  Symptoms (please be specific):    How long has patient had these symptoms:    Pharmacy name and phone #:    Would the patient rather a call back or a response via [facility]? Call back  Best Call Back Number: [phone]  Additional Information: pt is requesting a call back about personal issues
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
## 2023-09-14 NOTE — LETTER
September 14, 2023    Samuel García  [address] [place] [id number]             [facility]  Primary Care  [address], [address]  [facility] [address]  Phone: [phone]  Fax: [phone]   September 14, 2023     Patient: Samuel García   YOB: 1975   Date of Visit: 9/14/2023       To Whom it May Concern:    Samuel García was seen in my clinic on 9/14/2023. He may return to work on 09/16/2023 .    Please excuse him from any  work missed.    If you have any questions or concerns, please don't hesitate to call.    Sincerely,         Jose Daniel Rodas MD

## 2024-01-18 ENCOUNTER — HOSPITAL ENCOUNTER (EMERGENCY)
Facility: HOSPITAL | Age: 49
Discharge: HOME | End: 2024-01-18
Attending: INTERNAL MEDICINE

## 2024-01-18 VITALS
WEIGHT: 191.81 LBS | RESPIRATION RATE: 16 BRPM | DIASTOLIC BLOOD PRESSURE: 96 MMHG | TEMPERATURE: 98 F | SYSTOLIC BLOOD PRESSURE: 159 MMHG | HEART RATE: 67 BPM | OXYGEN SATURATION: 100 % | BODY MASS INDEX: 28.32 KG/M2

## 2024-01-18 DIAGNOSIS — R35.0 URINARY FREQUENCY: Primary | ICD-10-CM

## 2024-01-18 LAB
APPEARANCE UR: CLEAR
BACTERIA #/AREA URNS AUTO: ABNORMAL /HPF
BILIRUB UR QL STRIP.AUTO: NEGATIVE
C TRACH DNA SPEC QL NAA+PROBE: NOT DETECTED
COLOR UR AUTO: COLORLESS
GLUCOSE UR QL STRIP.AUTO: NORMAL
HYALINE CASTS #/AREA URNS LPF: ABNORMAL /LPF
KETONES UR QL STRIP.AUTO: NEGATIVE
LEUKOCYTE ESTERASE UR QL STRIP.AUTO: NEGATIVE
MUCOUS THREADS URNS QL MICRO: ABNORMAL /LPF
N GONORRHOEA DNA SPEC QL NAA+PROBE: NOT DETECTED
NITRITE UR QL STRIP.AUTO: NEGATIVE
PH UR STRIP.AUTO: 5 [PH]
PROT UR QL STRIP.AUTO: NEGATIVE
RBC #/AREA URNS AUTO: ABNORMAL /HPF
RBC UR QL AUTO: NEGATIVE
SOURCE (OHS): NORMAL
SP GR UR STRIP.AUTO: 1.01 (ref 1–1.03)
SQUAMOUS #/AREA URNS LPF: ABNORMAL /HPF
UROBILINOGEN UR STRIP-ACNC: NORMAL
WBC #/AREA URNS AUTO: ABNORMAL /HPF

## 2024-01-18 PROCEDURE — 99283 EMERGENCY DEPT VISIT LOW MDM: CPT

## 2024-01-18 PROCEDURE — 87491 CHLMYD TRACH DNA AMP PROBE: CPT | Performed by: NURSE PRACTITIONER

## 2024-01-18 PROCEDURE — 81001 URINALYSIS AUTO W/SCOPE: CPT | Performed by: NURSE PRACTITIONER

## 2024-01-18 RX ORDER — PHENAZOPYRIDINE HYDROCHLORIDE 100 MG/1
100 TABLET, FILM COATED ORAL 3 TIMES DAILY PRN
Qty: 12 TABLET | Refills: 0 | Status: SHIPPED | OUTPATIENT
Start: 2024-01-18

## 2024-01-18 NOTE — ED PROVIDER NOTES
"Encounter Date: 1/18/2024       History     Chief Complaint   Patient presents with    Dysuria     RECURRENT URINARY FREQ.  X 2 DAYS.  STATES TREATED 2 WKS AGO FOR "BACTERIA" INFECTION.  DENIES URETHRAL DC.      The patient presents with urinary frequency and urgency.  The onset was 2  days ago.  The course/duration of symptoms is constant.  Radiating pain: none. The character of symptoms is frequency.  The degree at onset was minimal.  The degree at present is moderate.  The exacerbating factor is none.  The relieving factor is none.  Risk factors consist of none.  Therapy today: none.  Associated symptoms: increased frequency, urgency, denies any pain, denies fever, denies chills, denies nausea, denies vomiting, denies abdominal pain, denies back pain, denies pelvic pain, denies hematuria and denies urethral discharge. Additional history: he was treated 2 weeks ago for an uti with bactrim. He reports symptoms resolved but returned 2 days ago.      Review of patient's allergies indicates:  No Known Allergies  Past Medical History:   Diagnosis Date    Headache     Insomnia      History reviewed. No pertinent surgical history.  Family History   Problem Relation Age of Onset    Diabetes Mother     Hypertension Brother      Social History     Tobacco Use    Smoking status: Never    Smokeless tobacco: Never   Substance Use Topics    Drug use: Never     Review of Systems   Constitutional:  Negative for fever.   HENT:  Negative for sore throat.    Respiratory:  Negative for shortness of breath.    Cardiovascular:  Negative for chest pain.   Gastrointestinal:  Negative for nausea.   Genitourinary:  Positive for frequency and urgency. Negative for dysuria.   Musculoskeletal:  Negative for back pain.   Skin:  Negative for rash.   Neurological:  Negative for weakness.   Hematological:  Does not bruise/bleed easily.   All other systems reviewed and are negative.      Physical Exam     Initial Vitals [01/18/24 1355]   BP Pulse " Resp Temp SpO2   (!) 159/96 67 16 97.9 °F (36.6 °C) 100 %      MAP       --         Physical Exam    Nursing note and vitals reviewed.  Constitutional: He appears well-developed and well-nourished.   HENT:   Head: Normocephalic and atraumatic.   Neck: Neck supple.   Normal range of motion.  Cardiovascular:  Normal rate, regular rhythm, normal heart sounds and intact distal pulses.           Pulmonary/Chest: Effort normal and breath sounds normal. He has no decreased breath sounds.   Abdominal: Abdomen is soft and flat. Bowel sounds are normal. There is no abdominal tenderness.   Musculoskeletal:         General: Normal range of motion.      Cervical back: Normal range of motion and neck supple.     Neurological: He is alert and oriented to person, place, and time. He has normal strength.   Skin: Skin is warm and dry.   Psychiatric: He has a normal mood and affect.         ED Course   Procedures  Labs Reviewed   URINALYSIS, REFLEX TO URINE CULTURE - Abnormal; Notable for the following components:       Result Value    Color, UA Colorless (*)     Mucous, UA Trace (*)     All other components within normal limits   CHLAMYDIA/GONORRHOEAE(GC), PCR    Narrative:     The Xpert CT/NG test, performed on the [employer]pert system is a qualitative in vitro real-time polymerase chain reaction (PCR) test for the automated detected and differentiation for genomic DNA from Chlamydia trachomatis (CT) and/or Neisseria gonorrhoeae (NG).          Imaging Results    None          Medications - No data to display  Medical Decision Making  The patient presents with urinary frequency and urgency.  The onset was 2  days ago.  The course/duration of symptoms is constant.  Radiating pain: none. The character of symptoms is frequency.  The degree at onset was minimal.  The degree at present is moderate.  The exacerbating factor is none.  The relieving factor is none.  Risk factors consist of none.  Therapy today: none.  Associated symptoms:  increased frequency, urgency, denies any pain, denies fever, denies chills, denies nausea, denies vomiting, denies abdominal pain, denies back pain, denies pelvic pain, denies hematuria and denies urethral discharge. Additional history: he was treated 2 weeks ago for an uti with bactrim. He reports symptoms resolved but returned 2 days ago.    U/A clean, will prescribe course of pyridium and refer to medicine clinic per request for a pcp    Risk  Prescription drug management.      Additional MDM:   Differential Diagnosis:   Urethritis, cystitis, pyelonephritis, herpes, candidiasis, among others                                     Clinical Impression:  Final diagnoses:  [R35.0] Urinary frequency (Primary)          ED Disposition Condition    Discharge Stable          ED Prescriptions       Medication Sig Dispense Start Date End Date Auth. Provider    phenazopyridine (PYRIDIUM) 100 MG tablet Take 1 tablet (100 mg total) by mouth 3 (three) times daily as needed (bladder pain). 12 tablet 1/18/2024 -- Shon Goldstein ACNP          Follow-up Information       Follow up With Specialties Details Why Contact Info    referral sent to medicine clinic per request for a primary care provider        [facility] Emergency Medicine  If symptoms worsen [address] [facility] [address] [phone]             Shon Goldtsein ACNP  01/18/24 [id number]

## 2024-08-06 ENCOUNTER — HOSPITAL ENCOUNTER (EMERGENCY)
Facility: HOSPITAL | Age: 49
Discharge: HOME | End: 2024-08-06
Attending: EMERGENCY MEDICINE

## 2024-08-06 VITALS
OXYGEN SATURATION: 97 % | BODY MASS INDEX: 29.06 KG/M2 | HEIGHT: 69 IN | TEMPERATURE: 98 F | HEART RATE: 97 BPM | WEIGHT: 196.19 LBS | SYSTOLIC BLOOD PRESSURE: 144 MMHG | DIASTOLIC BLOOD PRESSURE: 90 MMHG | RESPIRATION RATE: 20 BRPM

## 2024-08-06 DIAGNOSIS — U07.1 COVID-19 VIRUS INFECTION: Primary | ICD-10-CM

## 2024-08-06 LAB — SARS-COV-2 RDRP RESP QL NAA+PROBE: POSITIVE

## 2024-08-06 PROCEDURE — 99283 EMERGENCY DEPT VISIT LOW MDM: CPT

## 2024-08-06 PROCEDURE — U0002 COVID-19 LAB TEST NON-CDC: HCPCS | Performed by: PHYSICIAN ASSISTANT

## 2024-08-06 RX ORDER — BENZONATATE 100 MG/1
100 CAPSULE ORAL 3 TIMES DAILY PRN
Qty: 20 CAPSULE | Refills: 0 | Status: SHIPPED | OUTPATIENT
Start: 2024-08-06 | End: 2024-08-16

## 2024-08-06 RX ORDER — FLUTICASONE PROPIONATE 50 MCG
1 SPRAY, SUSPENSION (ML) NASAL 2 TIMES DAILY PRN
Qty: 15 G | Refills: 0 | Status: SHIPPED | OUTPATIENT
Start: 2024-08-06

## 2024-08-06 NOTE — ED PROVIDER NOTES
Encounter Date: 8/6/2024       History     Chief Complaint   Patient presents with    Sinusitis    Generalized Body Aches     C/o above symptoms since yesterday     Samuel García is a 49 y.o. male who presents to the ED complaining of congestion, body aches, and headache. Symptoms started yesterday and have been unrelieved with OTC meds. No known sick contacts, but he [place] a mask. He denies chest pain, SOB, N/V/D.    The history is provided by the patient.     Review of patient's allergies indicates:  No Known Allergies  Past Medical History:   Diagnosis Date    Headache     Insomnia      History reviewed. No pertinent surgical history.  Family History   Problem Relation Name Age of Onset    Diabetes Mother      Hypertension Brother       Social History     Tobacco Use    Smoking status: Never    Smokeless tobacco: Never   Substance Use Topics    Alcohol use: Never    Drug use: Never     Review of Systems   Constitutional:  Negative for activity change, chills and fever.   HENT:  Positive for congestion. Negative for trouble swallowing.    Eyes:  Negative for photophobia and visual disturbance.   Respiratory:  Positive for cough. Negative for chest tightness, shortness of breath and wheezing.    Cardiovascular:  Negative for chest pain, palpitations and leg swelling.   Gastrointestinal:  Negative for abdominal pain, constipation, diarrhea, nausea and vomiting.   Genitourinary:  Negative for dysuria, frequency, hematuria and urgency.   Musculoskeletal:  Negative for arthralgias, back pain and gait problem.   Skin:  Negative for color change and rash.   Neurological:  Positive for headaches. Negative for dizziness, syncope, weakness, light-headedness and numbness.   Psychiatric/Behavioral:  Negative for agitation and confusion. The patient is not nervous/anxious.        Physical Exam     Initial Vitals [08/06/24 0837]   BP Pulse Resp Temp SpO2   (!) 144/90 97 20 98.1 °F (36.7  °C) 97 %      MAP       --         Physical Exam    Nursing note and vitals reviewed.  Constitutional: He appears well-developed and well-nourished. No distress.   HENT:   Head: Normocephalic and atraumatic.   Nose: Rhinorrhea present.   Mouth/Throat: Posterior oropharyngeal erythema present. No oropharyngeal exudate.   Eyes: EOM are normal. No scleral icterus.   Neck: Neck supple.   Normal range of motion.  Cardiovascular:  Normal rate and regular rhythm.           No murmur heard.  Pulmonary/Chest: No respiratory distress. He has no wheezes.   Abdominal: Abdomen is soft. He exhibits no distension. There is no abdominal tenderness.   Musculoskeletal:         General: No edema. Normal range of motion.      Cervical back: Normal range of motion and neck supple.     Neurological: He is alert and oriented to person, place, and time. No cranial nerve deficit.   Skin: Skin is warm and dry. Capillary refill takes less than 2 seconds. No erythema.   Psychiatric: He has a normal mood and affect. Thought content normal.         ED Course   Procedures  Labs Reviewed   SARS-COV-2 RNA AMPLIFICATION, QUAL - Abnormal       Result Value    SARS COV-2 Molecular Positive (*)     Narrative:     The IDNOW COVID-19 assay is a rapid molecular in vitro diagnostic test utilizing an isothermal nucleic acid amplification technology intended for the qualitative detection of nucleic acid from the SARS-CoV-2 viral RNA in direct nasal, nasopharyngeal or throat swabs from individuals who are suspected of COVID-19 by their healthcare provider.          Imaging Results    None          Medications - No data to display  Medical Decision Making  Differential: COVID, sinnusitis, among others    ED management: Pt HDS and afebrile. Lungs CTA bilaterally. + rhinorrhea and oropharyngeal erythema on exam. COVID positive. Stable for discharge home with symptomatic management. Instructed to quarantine for 5 days per CDC guidelines and follow up with PCP in  1 week. ED return precautions given. He verbalized understanding. All questions answered.     Amount and/or Complexity of Data Reviewed  Labs:  Decision-making details documented in ED Course.    Risk  Prescription drug management.               ED Course as of 08/06/24 0924   Tue Aug 06, 2024   0917 SARS COV-2 MOLECULAR(!): Positive [KD]      ED Course User Index  [KD] Shakira Patton PA-C                           Clinical Impression:  Final diagnoses:  [U07.1] COVID-19 virus infection (Primary)          ED Disposition Condition    Discharge Stable          ED Prescriptions       Medication Sig Dispense Start Date End Date Auth. Provider    benzonatate (TESSALON) 100 MG capsule Take 1 capsule (100 mg total) by mouth 3 (three) times daily as needed for Cough. 20 capsule 8/6/2024 8/16/2024 Shakira Patton PA-C    fluticasone propionate (FLONASE) 50 mcg/actuation nasal spray 1 spray (50 mcg total) by Each Nostril route 2 (two) times daily as needed for Rhinitis. 15 g 8/6/2024 -- Shakira Patton PA-C          Follow-up Information       Follow up With Specialties Details Why Contact Info    [facility] Emergency Medicine  If symptoms worsen [address] [facility] [address] [phone]    Jose Daniel Rodas MD Internal Medicine In 1 week Hospital follow up [address] [facility] [address]  [phone]               Shakira Patton PA-C  08/06/24 0924

## 2024-08-06 NOTE — Clinical Note
"Samuel"FANNY García was seen and treated in our emergency department on 8/6/2024.     COVID-19 is present in our communities across the state. There is limited testing for COVID at this time, so not all patients can be tested. In this situation, your employee meets the following criteria:    Samuel García has met the criteria for COVID-19 testing and has a POSITIVE result. He can return to work once they are asymptomatic for 24 hours without the use of fever reducing medications AND at least five days from the first positive result. A mask is recommended for 5 days post quarantine.     If you have any questions or concerns, or if I can be of further assistance, please do not hesitate to contact me.    Sincerely,             Shakira Patton, NINA"